# Patient Record
Sex: FEMALE | Race: AMERICAN INDIAN OR ALASKA NATIVE
[De-identification: names, ages, dates, MRNs, and addresses within clinical notes are randomized per-mention and may not be internally consistent; named-entity substitution may affect disease eponyms.]

---

## 2018-03-31 ENCOUNTER — HOSPITAL ENCOUNTER (EMERGENCY)
Dept: HOSPITAL 5 - ED | Age: 53
Discharge: LEFT BEFORE DISCHARGE | End: 2018-03-31
Payer: SELF-PAY

## 2018-03-31 VITALS — DIASTOLIC BLOOD PRESSURE: 87 MMHG | SYSTOLIC BLOOD PRESSURE: 122 MMHG
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
## 2018-03-31 DIAGNOSIS — Z88.2: ICD-10-CM
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
## 2018-03-31 DIAGNOSIS — Z88.1: ICD-10-CM
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
## 2018-03-31 DIAGNOSIS — I10: ICD-10-CM

## 2018-03-31 DIAGNOSIS — M54.5: ICD-10-CM

## 2018-03-31 DIAGNOSIS — M54.2: Primary | ICD-10-CM

## 2018-03-31 PROCEDURE — 99283 EMERGENCY DEPT VISIT LOW MDM: CPT

## 2018-03-31 PROCEDURE — 72100 X-RAY EXAM L-S SPINE 2/3 VWS: CPT

## 2018-03-31 PROCEDURE — 72040 X-RAY EXAM NECK SPINE 2-3 VW: CPT
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
